# Patient Record
Sex: FEMALE | Race: OTHER | HISPANIC OR LATINO
[De-identification: names, ages, dates, MRNs, and addresses within clinical notes are randomized per-mention and may not be internally consistent; named-entity substitution may affect disease eponyms.]

---

## 2017-09-05 ENCOUNTER — APPOINTMENT (OUTPATIENT)
Dept: OTOLARYNGOLOGY | Facility: CLINIC | Age: 42
End: 2017-09-05
Payer: COMMERCIAL

## 2017-09-05 VITALS
DIASTOLIC BLOOD PRESSURE: 86 MMHG | BODY MASS INDEX: 27.66 KG/M2 | HEART RATE: 74 BPM | SYSTOLIC BLOOD PRESSURE: 128 MMHG | WEIGHT: 162 LBS | HEIGHT: 64 IN

## 2017-09-05 DIAGNOSIS — Z09 ENCOUNTER FOR FOLLOW-UP EXAMINATION AFTER COMPLETED TREATMENT FOR CONDITIONS OTHER THAN MALIGNANT NEOPLASM: ICD-10-CM

## 2017-09-05 DIAGNOSIS — J34.2 DEVIATED NASAL SEPTUM: ICD-10-CM

## 2017-09-05 DIAGNOSIS — J39.2 OTHER DISEASES OF PHARYNX: ICD-10-CM

## 2017-09-05 DIAGNOSIS — F45.8 OTHER SOMATOFORM DISORDERS: ICD-10-CM

## 2017-09-05 DIAGNOSIS — J32.0 CHRONIC MAXILLARY SINUSITIS: ICD-10-CM

## 2017-09-05 DIAGNOSIS — R49.0 DYSPHONIA: ICD-10-CM

## 2017-09-05 DIAGNOSIS — J34.3 HYPERTROPHY OF NASAL TURBINATES: ICD-10-CM

## 2017-09-05 PROCEDURE — 95004 PERQ TESTS W/ALRGNC XTRCS: CPT

## 2017-09-05 PROCEDURE — 31231 NASAL ENDOSCOPY DX: CPT

## 2017-09-05 PROCEDURE — 99214 OFFICE O/P EST MOD 30 MIN: CPT | Mod: 25

## 2020-05-26 ENCOUNTER — EMERGENCY (EMERGENCY)
Facility: HOSPITAL | Age: 45
LOS: 1 days | Discharge: HOME | End: 2020-05-26
Attending: EMERGENCY MEDICINE | Admitting: EMERGENCY MEDICINE
Payer: COMMERCIAL

## 2020-05-26 VITALS
TEMPERATURE: 98 F | OXYGEN SATURATION: 100 % | RESPIRATION RATE: 16 BRPM | HEART RATE: 90 BPM | SYSTOLIC BLOOD PRESSURE: 140 MMHG | DIASTOLIC BLOOD PRESSURE: 84 MMHG

## 2020-05-26 VITALS
DIASTOLIC BLOOD PRESSURE: 94 MMHG | SYSTOLIC BLOOD PRESSURE: 147 MMHG | HEART RATE: 79 BPM | OXYGEN SATURATION: 100 % | RESPIRATION RATE: 16 BRPM

## 2020-05-26 DIAGNOSIS — Z98.89 OTHER SPECIFIED POSTPROCEDURAL STATES: Chronic | ICD-10-CM

## 2020-05-26 LAB
ALBUMIN SERPL ELPH-MCNC: 4.3 G/DL — SIGNIFICANT CHANGE UP (ref 3.3–5)
ALP SERPL-CCNC: 89 U/L — SIGNIFICANT CHANGE UP (ref 40–120)
ALT FLD-CCNC: 19 U/L — SIGNIFICANT CHANGE UP (ref 4–33)
ANION GAP SERPL CALC-SCNC: 13 MMO/L — SIGNIFICANT CHANGE UP (ref 7–14)
AST SERPL-CCNC: 20 U/L — SIGNIFICANT CHANGE UP (ref 4–32)
BASOPHILS # BLD AUTO: 0.05 K/UL — SIGNIFICANT CHANGE UP (ref 0–0.2)
BASOPHILS NFR BLD AUTO: 0.6 % — SIGNIFICANT CHANGE UP (ref 0–2)
BILIRUB SERPL-MCNC: < 0.2 MG/DL — LOW (ref 0.2–1.2)
BUN SERPL-MCNC: 10 MG/DL — SIGNIFICANT CHANGE UP (ref 7–23)
CALCIUM SERPL-MCNC: 9.4 MG/DL — SIGNIFICANT CHANGE UP (ref 8.4–10.5)
CHLORIDE SERPL-SCNC: 102 MMOL/L — SIGNIFICANT CHANGE UP (ref 98–107)
CO2 SERPL-SCNC: 24 MMOL/L — SIGNIFICANT CHANGE UP (ref 22–31)
CREAT SERPL-MCNC: 0.61 MG/DL — SIGNIFICANT CHANGE UP (ref 0.5–1.3)
EOSINOPHIL # BLD AUTO: 0.1 K/UL — SIGNIFICANT CHANGE UP (ref 0–0.5)
EOSINOPHIL NFR BLD AUTO: 1.2 % — SIGNIFICANT CHANGE UP (ref 0–6)
GLUCOSE SERPL-MCNC: 143 MG/DL — HIGH (ref 70–99)
HCT VFR BLD CALC: 41 % — SIGNIFICANT CHANGE UP (ref 34.5–45)
HGB BLD-MCNC: 13.5 G/DL — SIGNIFICANT CHANGE UP (ref 11.5–15.5)
IMM GRANULOCYTES NFR BLD AUTO: 0.4 % — SIGNIFICANT CHANGE UP (ref 0–1.5)
LYMPHOCYTES # BLD AUTO: 1.8 K/UL — SIGNIFICANT CHANGE UP (ref 1–3.3)
LYMPHOCYTES # BLD AUTO: 21.5 % — SIGNIFICANT CHANGE UP (ref 13–44)
MCHC RBC-ENTMCNC: 28.5 PG — SIGNIFICANT CHANGE UP (ref 27–34)
MCHC RBC-ENTMCNC: 32.9 % — SIGNIFICANT CHANGE UP (ref 32–36)
MCV RBC AUTO: 86.5 FL — SIGNIFICANT CHANGE UP (ref 80–100)
MONOCYTES # BLD AUTO: 0.54 K/UL — SIGNIFICANT CHANGE UP (ref 0–0.9)
MONOCYTES NFR BLD AUTO: 6.5 % — SIGNIFICANT CHANGE UP (ref 2–14)
NEUTROPHILS # BLD AUTO: 5.85 K/UL — SIGNIFICANT CHANGE UP (ref 1.8–7.4)
NEUTROPHILS NFR BLD AUTO: 69.8 % — SIGNIFICANT CHANGE UP (ref 43–77)
NRBC # FLD: 0 K/UL — SIGNIFICANT CHANGE UP (ref 0–0)
PLATELET # BLD AUTO: 334 K/UL — SIGNIFICANT CHANGE UP (ref 150–400)
PMV BLD: 9.5 FL — SIGNIFICANT CHANGE UP (ref 7–13)
POTASSIUM SERPL-MCNC: 4 MMOL/L — SIGNIFICANT CHANGE UP (ref 3.5–5.3)
POTASSIUM SERPL-SCNC: 4 MMOL/L — SIGNIFICANT CHANGE UP (ref 3.5–5.3)
PROT SERPL-MCNC: 8.1 G/DL — SIGNIFICANT CHANGE UP (ref 6–8.3)
RBC # BLD: 4.74 M/UL — SIGNIFICANT CHANGE UP (ref 3.8–5.2)
RBC # FLD: 13.5 % — SIGNIFICANT CHANGE UP (ref 10.3–14.5)
SODIUM SERPL-SCNC: 139 MMOL/L — SIGNIFICANT CHANGE UP (ref 135–145)
TROPONIN T, HIGH SENSITIVITY: < 6 NG/L — SIGNIFICANT CHANGE UP (ref ?–14)
WBC # BLD: 8.37 K/UL — SIGNIFICANT CHANGE UP (ref 3.8–10.5)
WBC # FLD AUTO: 8.37 K/UL — SIGNIFICANT CHANGE UP (ref 3.8–10.5)

## 2020-05-26 PROCEDURE — 99284 EMERGENCY DEPT VISIT MOD MDM: CPT

## 2020-05-26 RX ORDER — MECLIZINE HCL 12.5 MG
50 TABLET ORAL ONCE
Refills: 0 | Status: COMPLETED | OUTPATIENT
Start: 2020-05-26 | End: 2020-05-26

## 2020-05-26 RX ORDER — METOCLOPRAMIDE HCL 10 MG
10 TABLET ORAL ONCE
Refills: 0 | Status: COMPLETED | OUTPATIENT
Start: 2020-05-26 | End: 2020-05-26

## 2020-05-26 RX ORDER — KETOROLAC TROMETHAMINE 30 MG/ML
15 SYRINGE (ML) INJECTION ONCE
Refills: 0 | Status: DISCONTINUED | OUTPATIENT
Start: 2020-05-26 | End: 2020-05-26

## 2020-05-26 RX ORDER — SODIUM CHLORIDE 9 MG/ML
1000 INJECTION INTRAMUSCULAR; INTRAVENOUS; SUBCUTANEOUS ONCE
Refills: 0 | Status: COMPLETED | OUTPATIENT
Start: 2020-05-26 | End: 2020-05-26

## 2020-05-26 RX ADMIN — Medication 15 MILLIGRAM(S): at 04:34

## 2020-05-26 RX ADMIN — Medication 50 MILLIGRAM(S): at 04:34

## 2020-05-26 RX ADMIN — SODIUM CHLORIDE 1000 MILLILITER(S): 9 INJECTION INTRAMUSCULAR; INTRAVENOUS; SUBCUTANEOUS at 04:33

## 2020-05-26 RX ADMIN — Medication 10 MILLIGRAM(S): at 04:34

## 2020-05-26 NOTE — ED PROVIDER NOTE - OBJECTIVE STATEMENT
44F denies pmh presents with c/o headache x 1-2 days.  States she had mild headache the previous evening when she went to bed, woke up with continued headache that progressively worsened through the day.  Pain radiates down right posterior neck to right shoulder.  +Nausea.  Took ibuprofen in the morning and Excedrin in the afternoon with little alleviation.  Notes that she felt dizzy when turning in bed, described as sensation of room spinning.  Also c/o intermittently feeling SOB over past few days.   Denies fever/chills, cp, sob, v/d, visual changes.

## 2020-05-26 NOTE — ED PROVIDER NOTE - PROGRESS NOTE DETAILS
Isabella Patterson M.D. Resident Isabella Patterson M.D. Resident  Pt states headache mildly better. Pt aware of negative workup, agreeable to followup with PMD.

## 2020-05-26 NOTE — ED PROVIDER NOTE - PATIENT PORTAL LINK FT
You can access the FollowMyHealth Patient Portal offered by [facility] by registering at the following website: [email or web address]. By joining [employer]’s FollowMyHealth portal, you will also be able to view your health information using other applications (apps) compatible with our system.

## 2020-05-26 NOTE — ED ADULT NURSE NOTE - OBJECTIVE STATEMENT
Pt. presents to intake a&ox4, ambulatory, and self-care. Pt. c/o headache starting from her eyes leading to the back of her neck. Pt. states the pain radiates down the neck to the right shoulder and endorses feeling nauseous. Pt. denies any chest pain, vomiting, fevers, chills, or body aches. Respirations even and unlabored. PHx of anxiety, and IBS. 20 G IV placed to L AC. Labs drawn and sent. Meds given per MD order. Awaiting further orders. Will continue to monitor.

## 2020-05-26 NOTE — ED PROVIDER NOTE - PMH
Anxiety    Breast cyst    Gall stones    GERD (gastroesophageal reflux disease)    GERD (gastroesophageal reflux disease)    H pylori ulcer  treated and resolved

## 2020-05-26 NOTE — ED PROVIDER NOTE - NSFOLLOWUPINSTRUCTIONS_ED_ALL_ED_FT
You were seen in the Emergency Department (ED) for headache.     Please take over the counter Tylenol as directed by the packaging for your pain.     Please follow up with your primary care doctor in the next 72 hours.  If you have issues obtaining follow up, please call: [phone] to obtain a doctor or specialist who takes your insurance in your area.    Please return to the ED if you experience any new or concerning symptoms, such as: worsening headache, vision change, chest pain, difficulty breathing, passing out, fever, chills.     Thank you for visiting a [facility] ED.

## 2020-05-26 NOTE — ED ADULT TRIAGE NOTE - CHIEF COMPLAINT QUOTE
Pt reporting frontal headache radiating to neck starting this morning with nausea. No blurred vision photophobia or vomiting.

## 2020-05-26 NOTE — ED PROVIDER NOTE - CLINICAL SUMMARY MEDICAL DECISION MAKING FREE TEXT BOX
- headache radiating to right shoulder, no red flags on history/exam, likely tension vs migraine headache; r/o ACS  - cbc, cmp, trop, meds, reassess

## 2020-05-28 ENCOUNTER — TRANSCRIPTION ENCOUNTER (OUTPATIENT)
Age: 45
End: 2020-05-28

## 2020-06-03 ENCOUNTER — APPOINTMENT (OUTPATIENT)
Dept: NEUROLOGY | Facility: CLINIC | Age: 45
End: 2020-06-03
Payer: COMMERCIAL

## 2020-06-03 VITALS — TEMPERATURE: 99.1 F

## 2020-06-03 VITALS — DIASTOLIC BLOOD PRESSURE: 80 MMHG | HEART RATE: 87 BPM | SYSTOLIC BLOOD PRESSURE: 142 MMHG

## 2020-06-03 PROCEDURE — 99205 OFFICE O/P NEW HI 60 MIN: CPT

## 2020-06-03 NOTE — HISTORY OF PRESENT ILLNESS
[FreeTextEntry1] : This is a 44-year-old right-handed female who presents with chief complaints of headaches.\par \par Patient states that she has a history of headaches which are usually around her menstrual cycle however in May she experienced a severe headache associated with dizziness which she describes as vertigo, made worse by change in body position and, nausea. The headache was initially in the forehead and she had a burning sensation in her eyes nose and top of her head. Toward the end of the day the headache spread to her spine and shoulder regions. She went to the emergency room at   [facility] where she was given ketorolac and metoclopramide. She states that her blood pressure was slightly elevated at that time. She describes the headache as all over her head and pulsating in character. It was equal on both sides. She was discharged.\par 3 days later the headache was still present so she went to an urgi Center. Her blood pressure was 127 /80  covid19 test was done, results pending\par \par Currently she is headache free. She states that in her 20s she used to get migraine headaches. After the birth of her first child in her 30s her headaches had reduced. Over the last 5-6 years she has been getting headaches around her menstrual cycle. The headaches are usually aggravated by strong smells. She prefers to be in a quite dark room. If she does not have any nausea or vomiting with her headaches. Headaches and it is relieved with Advil.\par \par She denies any other neurological complaints. No change in vision no recent weight gain. No weakness tingling numbness no change in balance. No falls. Difficulties with speech. No rashes. No joint swelling.\par \par Review of systems: A complete review of systems is performed and is negative except as listed in history of present illness\par \par \par Social history does not smoke, occasionally alcohol.\par Works as a \par \par Past medical history\par Fatty liver\par Cholelithiasis status post cholecystectomy\par Thornwaldt cyst\par Irritable bowel disease\par \par Medications\par P.r.n. Advil\par Multivitamins on and off\par \par Family history\par Father with headaches, diabetes and hypertension\par Paternal grandmother diabetes hypertension\par Mom hypertension\par maternal grandmother sudden death in her 70s\par maternal grandfather lung cancer\par

## 2020-06-03 NOTE — DISCUSSION/SUMMARY
[FreeTextEntry1] : This is a 44-year-old white handed female with known history of migraine headaches, usually around her menstrual cycle it is relieved with Advil. \par She presented to the emergency room with severe headache may 26th. This was different from her usual headache and a sense that was long lasting severe associated with nausea dizziness and pain in her neck that was going to her spine and shoulders.\par She has had a few episodes of blood pressure in the 140s. Her blood pressure was one 145/87\par Nonfocal neurological exam\par Plan\par MRI of the brain with contrast, for change in headache \par MRI C-spine patient is reporting neck pain which radiates down to her spine and shoulders\par Labs-CMP, CBC, TSH, ESR, CRP, beta-hCG, hba1c\par Imitrex 25 mg to take at the onset of headache\par Discussed treatment options such as propranolol that could help with high blood pressure and headaches. Her headaches are quite infrequent usually once a month.\par Encouraged to follow up with PCP for high blood pressure\par Has an ophthalmology appointment June 9\par Followup in 6 weeks

## 2020-06-03 NOTE — PHYSICAL EXAM
[General Appearance - Alert] : alert [General Appearance - In No Acute Distress] : in no acute distress [Oriented To Time, Place, And Person] : oriented to person, place, and time [Impaired Insight] : insight and judgment were intact [Person] : oriented to person [Affect] : the affect was normal [Time] : oriented to time [Place] : oriented to place [Concentration Intact] : normal concentrating ability [Visual Intact] : visual attention was ~T not ~L decreased [Naming Objects] : no difficulty naming common objects [Repeating Phrases] : no difficulty repeating a phrase [Writing A Sentence] : no difficulty writing a sentence [Fluency] : fluency intact [Comprehension] : comprehension intact [Past History] : adequate knowledge of personal past history [Reading] : reading intact [Cranial Nerves Oculomotor (III)] : extraocular motion intact [Cranial Nerves Optic (II)] : visual acuity intact bilaterally,  visual fields full to confrontation, pupils equal round and reactive to light [Cranial Nerves Trigeminal (V)] : facial sensation intact symmetrically [Cranial Nerves Facial (VII)] : face symmetrical [Cranial Nerves Vestibulocochlear (VIII)] : hearing was intact bilaterally [Cranial Nerves Glossopharyngeal (IX)] : tongue and palate midline [Cranial Nerves Hypoglossal (XII)] : there was no tongue deviation with protrusion [Cranial Nerves Accessory (XI - Cranial And Spinal)] : head turning and shoulder shrug symmetric [Motor Tone] : muscle tone was normal in all four extremities [Motor Strength] : muscle strength was normal in all four extremities [Sensation Tactile Decrease] : light touch was intact [No Muscle Atrophy] : normal bulk in all four extremities [Past-pointing] : there was no past-pointing [Balance] : balance was intact [Tremor] : no tremor present [Plantar Reflex Left Only] : normal on the left [Plantar Reflex Right Only] : normal on the right [Extraocular Movements] : extraocular movements were intact [Full Visual Field] : full visual field [Hearing Threshold Finger Rub Not [place]] : hearing was normal [Outer Ear] : the ears and nose were normal in appearance [Neck Appearance] : the appearance of the neck was normal [FreeTextEntry1] : Lhermitte sign negative [Edema] : there was no peripheral edema [] : no respiratory distress [Abnormal Walk] : normal gait [Skin Color & Pigmentation] : normal skin color and pigmentation

## 2020-06-09 ENCOUNTER — APPOINTMENT (OUTPATIENT)
Dept: OPHTHALMOLOGY | Facility: CLINIC | Age: 45
End: 2020-06-09
Payer: COMMERCIAL

## 2020-06-09 ENCOUNTER — NON-APPOINTMENT (OUTPATIENT)
Age: 45
End: 2020-06-09

## 2020-06-09 PROCEDURE — 92004 COMPRE OPH EXAM NEW PT 1/>: CPT

## 2020-06-27 ENCOUNTER — OUTPATIENT (OUTPATIENT)
Dept: OUTPATIENT SERVICES | Facility: HOSPITAL | Age: 45
LOS: 1 days | End: 2020-06-27
Payer: COMMERCIAL

## 2020-06-27 ENCOUNTER — APPOINTMENT (OUTPATIENT)
Dept: MRI IMAGING | Facility: CLINIC | Age: 45
End: 2020-06-27
Payer: COMMERCIAL

## 2020-06-27 DIAGNOSIS — Z98.89 OTHER SPECIFIED POSTPROCEDURAL STATES: Chronic | ICD-10-CM

## 2020-06-27 DIAGNOSIS — R51 HEADACHE: ICD-10-CM

## 2020-06-27 PROCEDURE — 72141 MRI NECK SPINE W/O DYE: CPT

## 2020-06-27 PROCEDURE — A9585: CPT

## 2020-06-27 PROCEDURE — 70553 MRI BRAIN STEM W/O & W/DYE: CPT

## 2020-06-27 PROCEDURE — 72141 MRI NECK SPINE W/O DYE: CPT | Mod: 26

## 2020-06-27 PROCEDURE — 70553 MRI BRAIN STEM W/O & W/DYE: CPT | Mod: 26

## 2020-07-15 ENCOUNTER — APPOINTMENT (OUTPATIENT)
Dept: NEUROLOGY | Facility: CLINIC | Age: 45
End: 2020-07-15
Payer: COMMERCIAL

## 2020-07-15 VITALS
HEART RATE: 68 BPM | HEIGHT: 64 IN | WEIGHT: 169 LBS | BODY MASS INDEX: 28.85 KG/M2 | DIASTOLIC BLOOD PRESSURE: 84 MMHG | SYSTOLIC BLOOD PRESSURE: 126 MMHG

## 2020-07-15 VITALS — TEMPERATURE: 97.5 F

## 2020-07-15 DIAGNOSIS — M54.2 CERVICALGIA: ICD-10-CM

## 2020-07-15 PROCEDURE — 99214 OFFICE O/P EST MOD 30 MIN: CPT

## 2020-07-15 NOTE — PHYSICAL EXAM
[General Appearance - In No Acute Distress] : in no acute distress [General Appearance - Alert] : alert [Oriented To Time, Place, And Person] : oriented to person, place, and time [Impaired Insight] : insight and judgment were intact [Affect] : the affect was normal [Place] : oriented to place [Person] : oriented to person [Time] : oriented to time [Concentration Intact] : normal concentrating ability [Visual Intact] : visual attention was ~T not ~L decreased [Naming Objects] : no difficulty naming common objects [Repeating Phrases] : no difficulty repeating a phrase [Writing A Sentence] : no difficulty writing a sentence [Fluency] : fluency intact [Reading] : reading intact [Comprehension] : comprehension intact [Past History] : adequate knowledge of personal past history [Cranial Nerves Oculomotor (III)] : extraocular motion intact [Cranial Nerves Optic (II)] : visual acuity intact bilaterally,  visual fields full to confrontation, pupils equal round and reactive to light [Cranial Nerves Trigeminal (V)] : facial sensation intact symmetrically [Cranial Nerves Vestibulocochlear (VIII)] : hearing was intact bilaterally [Cranial Nerves Facial (VII)] : face symmetrical [Motor Tone] : muscle tone was normal in all four extremities [Cranial Nerves Glossopharyngeal (IX)] : tongue and palate midline [Cranial Nerves Accessory (XI - Cranial And Spinal)] : head turning and shoulder shrug symmetric [Cranial Nerves Hypoglossal (XII)] : there was no tongue deviation with protrusion [Motor Strength] : muscle strength was normal in all four extremities [No Muscle Atrophy] : normal bulk in all four extremities [Sensation Tactile Decrease] : light touch was intact [Balance] : balance was intact [Past-pointing] : there was no past-pointing [Plantar Reflex Right Only] : normal on the right [Tremor] : no tremor present [Plantar Reflex Left Only] : normal on the left [Full Visual Field] : full visual field [Extraocular Movements] : extraocular movements were intact [Hearing Threshold Finger Rub Not [place]] : hearing was normal [Neck Appearance] : the appearance of the neck was normal [Outer Ear] : the ears and nose were normal in appearance [FreeTextEntry1] : Lhermitte sign negative [] : no respiratory distress [Edema] : there was no peripheral edema [Abnormal Walk] : normal gait [Skin Color & Pigmentation] : normal skin color and pigmentation

## 2020-07-15 NOTE — HISTORY OF PRESENT ILLNESS
[FreeTextEntry1] : This is a 44-year-old right-handed female who presents with chief complaints of headaches.\par \par Patient states that she has a history of headaches which are usually around her menstrual cycle however in May she experienced a severe headache associated with dizziness which she describes as vertigo, made worse by change in body position and, nausea. The headache was initially in the forehead and she had a burning sensation in her eyes nose and top of her head. Toward the end of the day the headache spread to her spine and shoulder regions. She went to the emergency room at   [facility] where she was given ketorolac and metoclopramide. She states that her blood pressure was slightly elevated at that time. She describes the headache as all over her head and pulsating in character. It was equal on both sides. She was discharged.\par 3 days later the headache was still present so she went to an urgi Center. Her blood pressure was 127 /80  covid19 test was done, results pending\par \par Currently she is headache free. She states that in her 20s she used to get migraine headaches. After the birth of her first child in her 30s her headaches had reduced. Over the last 5-6 years she has been getting headaches around her menstrual cycle. The headaches are usually aggravated by strong smells. She prefers to be in a quite dark room. If she does not have any nausea or vomiting with her headaches. Headaches and it is relieved with Advil.\par \par She denies any other neurological complaints. No change in vision no recent weight gain. No weakness tingling numbness no change in balance. No falls. Difficulties with speech. No rashes. No joint swelling.\par \par Review of systems: A complete review of systems is performed and is negative except as listed in history of present illness\par \par \par Social history does not smoke, occasionally alcohol.\par Works as a \par \par Past medical history\par Fatty liver\par Cholelithiasis status post cholecystectomy\par Thornwaldt cyst\par Irritable bowel disease\par \par Medications\par P.r.n. Advil\par Multivitamins on and off\par \par Family history\par Father with headaches, diabetes and hypertension\par Paternal grandmother diabetes hypertension\par Mom hypertension\par maternal grandmother sudden death in her 70s\par maternal grandfather lung cancer\par \par Interval history: Patient is here to followup on imaging results. She has had no further headaches, denies any neurological symptoms other than numbness in hands when she wakes up in the morning. Denies any radicular symptoms, no weakness, no change in bowel or bladder. No change in balance. Does report occasional neck discomfort especially in the morning where she feels like she didn't have a comfortable pillow but this resolves as the day goes on. \par She also had a cardiology followup with 24-hour Holter. Medication was suggested to help with headaches and a rapid heart beat which she declined. Does not remember the name of the medication. She states she had labs done including normal lipids, CRP, ESR. I do not have that report.\par

## 2020-07-15 NOTE — DISCUSSION/SUMMARY
[FreeTextEntry1] : This is a 44-year-old white handed female with known history of migraine headaches, usually around her menstrual cycle it is relieved with Advil. \par She presented to the emergency room with severe headache may 26th. This was different from her usual headache and a sense that was long lasting severe associated with nausea dizziness and pain in her neck that was going to her spine and shoulders.\par She has had a few episodes of blood pressure in the 140s.\par Plan\par MRI of the brain with contrast, for change in headache - non specific R frontal periventricular lesion, non enhancing, no other lesions seen, images reviewed with her, printed report provided for her records\par MRI C-spine -cervical straightening, focal R paracentral disc herniation C5-C6 with moderate sac effacement, no cord compression, edema,myelopathy. non focal neuro exam. discussed pain control which at present is manageable, discussed availability of spine center. advised to avoid sudden neck movements and asked to monitor for any change in symptoms such as weakness, numbness, sensory changes, gait, b/b changes.\par transient bilateral hand numbness on waking up, likely CTS, can try wrist splints at night, if no improvement can consider EMG\par Labs-CMP, CBC, TSH, ESR, CRP, beta-hCG, hba1c done, will fax results, was told everything was wnl\par all questions addressed and answered\par F/u as needed

## 2020-08-18 ENCOUNTER — APPOINTMENT (OUTPATIENT)
Dept: ORTHOPEDIC SURGERY | Facility: CLINIC | Age: 45
End: 2020-08-18
Payer: COMMERCIAL

## 2020-08-18 VITALS
HEART RATE: 64 BPM | BODY MASS INDEX: 29.77 KG/M2 | SYSTOLIC BLOOD PRESSURE: 126 MMHG | TEMPERATURE: 98 F | DIASTOLIC BLOOD PRESSURE: 79 MMHG | WEIGHT: 168 LBS | HEIGHT: 63 IN

## 2020-08-18 DIAGNOSIS — M22.2X1 PATELLOFEMORAL DISORDERS, RIGHT KNEE: ICD-10-CM

## 2020-08-18 DIAGNOSIS — G56.32 LESION OF RADIAL NERVE, LEFT UPPER LIMB: ICD-10-CM

## 2020-08-18 PROCEDURE — 73080 X-RAY EXAM OF ELBOW: CPT | Mod: LT

## 2020-08-18 PROCEDURE — 99203 OFFICE O/P NEW LOW 30 MIN: CPT

## 2020-08-18 PROCEDURE — 73564 X-RAY EXAM KNEE 4 OR MORE: CPT | Mod: RT

## 2020-09-08 ENCOUNTER — APPOINTMENT (OUTPATIENT)
Dept: ORTHOPEDIC SURGERY | Facility: CLINIC | Age: 45
End: 2020-09-08

## 2021-09-13 ENCOUNTER — APPOINTMENT (OUTPATIENT)
Dept: OBGYN | Facility: CLINIC | Age: 46
End: 2021-09-13

## 2021-09-30 ENCOUNTER — RESULT REVIEW (OUTPATIENT)
Age: 46
End: 2021-09-30

## 2021-11-04 ENCOUNTER — APPOINTMENT (OUTPATIENT)
Dept: NEUROLOGY | Facility: CLINIC | Age: 46
End: 2021-11-04
Payer: MEDICAID

## 2021-11-04 ENCOUNTER — NON-APPOINTMENT (OUTPATIENT)
Age: 46
End: 2021-11-04

## 2021-11-04 VITALS
HEART RATE: 68 BPM | SYSTOLIC BLOOD PRESSURE: 130 MMHG | DIASTOLIC BLOOD PRESSURE: 76 MMHG | WEIGHT: 170 LBS | BODY MASS INDEX: 30.12 KG/M2 | HEIGHT: 63 IN

## 2021-11-04 DIAGNOSIS — R20.0 ANESTHESIA OF SKIN: ICD-10-CM

## 2021-11-04 DIAGNOSIS — R20.2 ANESTHESIA OF SKIN: ICD-10-CM

## 2021-11-04 PROCEDURE — 99214 OFFICE O/P EST MOD 30 MIN: CPT

## 2021-11-04 RX ORDER — FLUTICASONE PROPIONATE 50 UG/1
50 SPRAY, METERED NASAL DAILY
Qty: 1 | Refills: 3 | Status: DISCONTINUED | COMMUNITY
Start: 2017-09-05 | End: 2021-11-04

## 2021-11-04 RX ORDER — PREDNISONE 10 MG/1
10 TABLET ORAL
Qty: 27 | Refills: 0 | Status: DISCONTINUED | COMMUNITY
Start: 2017-09-05 | End: 2021-11-04

## 2021-11-04 RX ORDER — MELOXICAM 15 MG/1
15 TABLET ORAL DAILY
Qty: 30 | Refills: 0 | Status: DISCONTINUED | COMMUNITY
Start: 2020-08-18 | End: 2021-11-04

## 2021-11-04 RX ORDER — OXYMETAZOLINE HCL 0.05 %
0.05 SPRAY, NON-AEROSOL (ML) NASAL
Qty: 1 | Refills: 0 | Status: DISCONTINUED | COMMUNITY
Start: 2017-09-05 | End: 2021-11-04

## 2021-11-04 RX ORDER — AMOXICILLIN AND CLAVULANATE POTASSIUM 875; 125 MG/1; MG/1
875-125 TABLET, COATED ORAL
Qty: 28 | Refills: 0 | Status: DISCONTINUED | COMMUNITY
Start: 2017-09-05 | End: 2021-11-04

## 2021-11-04 NOTE — PHYSICAL EXAM
[General Appearance - Alert] : alert [Oriented To Time, Place, And Person] : oriented to person, place, and time [Person] : oriented to person [Place] : oriented to place [Time] : oriented to time [Short Term Intact] : short term memory intact [Fluency] : fluency intact [Current Events] : adequate knowledge of current events [Cranial Nerves Optic (II)] : visual acuity intact bilaterally,  visual fields full to confrontation, pupils equal round and reactive to light [Cranial Nerves Oculomotor (III)] : extraocular motion intact [Cranial Nerves Vestibulocochlear (VIII)] : hearing was intact bilaterally [Cranial Nerves Accessory (XI - Cranial And Spinal)] : head turning and shoulder shrug symmetric [Motor Tone] : muscle tone was normal in all four extremities [Motor Strength] : muscle strength was normal in all four extremities [Sensation Tactile Decrease] : light touch was intact [Sensation Pain / Temperature Decrease] : pain and temperature was intact [Dysesthesia] : no dysesthesia [Hyperesthesia] : no hyperesthesia [Abnormal Walk] : normal gait [Coordination - Dysmetria Impaired Finger-to-Nose Bilateral] : not present [2+] : Patella left 2+

## 2021-11-04 NOTE — HISTORY OF PRESENT ILLNESS
[FreeTextEntry1] : 46-year-old woman who has seen Dr. Angel in the past for headaches.  Patient states that sumatriptan is helping her with her headaches when she gets it around her menses.  Patient had a question about the right frontal nonspecific lesion that was seen on her MRI of the brain that was ordered by Dr. Angel.  With the images were reviewed with the patient in detail and patient was advised that while it is nonenhancing this is a very nonspecific finding and we will continue to monitor her for any other neurologic symptoms and if she has other symptoms will repeat the MRI of the brain with and without contrast.  Patient states that in August she went to [facility] for 5 days and her skin felt taut.  When she went home she felt numbness and tingling in both legs and both hands and forearm.  It came every other day and in September it stopped.  Patient states that during that time.  She experiences symptoms whenever she sat for 8 hours.  Patient denied any fevers or prior events.  Patient did not have any medications or other trauma that might have triggered the symptoms.  [facility] was unremarkable in terms of GI upset.

## 2021-11-04 NOTE — DISCUSSION/SUMMARY
[FreeTextEntry1] : 46-year-old woman who is here for initial consultation of numbness and tingling in her legs, hands and forearms.  It lasted for about a month in August and September and it resolved on its own.  At this time we will continue to monitor her symptoms and if it ever returns we will have her come back for nerve conduction studies.  We went over the MRI of the head that was performed for headache and the right frontal lesion is nonspecific finding for someone who is 46.  She will continue to monitor for any other neurologic symptoms and return as needed.\par \par I spent the time noted on the day of this patient encounter preparing for, providing and documenting the above E/M service and counseling and educate patient on differential, workup, disease course, and treatment/management. Education was provided to the patient during this encounter. All questions and concerns were answered and addressed in detail. The patient verbalized understanding and agreed to plan. Patient was advised to continue to monitor for neurologic symptoms and to notify my office or go to the nearest emergency room if there are any changes. Any orders/referrals and communications were provided as well. \par Side effects of the above medications were discussed in detail including but not limited to applicable black box warning and teratogenicity as appropriate. \par Patient was advised to bring previous records to my office. \par \par \par

## 2022-09-21 ENCOUNTER — APPOINTMENT (OUTPATIENT)
Dept: NEUROLOGY | Facility: CLINIC | Age: 47
End: 2022-09-21

## 2022-09-21 VITALS
HEART RATE: 72 BPM | BODY MASS INDEX: 30.12 KG/M2 | WEIGHT: 170 LBS | DIASTOLIC BLOOD PRESSURE: 78 MMHG | HEIGHT: 63 IN | SYSTOLIC BLOOD PRESSURE: 128 MMHG

## 2022-09-21 DIAGNOSIS — R44.8 OTHER SYMPTOMS AND SIGNS INVOLVING GENERAL SENSATIONS AND PERCEPTIONS: ICD-10-CM

## 2022-09-21 DIAGNOSIS — R51.9 HEADACHE, UNSPECIFIED: ICD-10-CM

## 2022-09-21 PROCEDURE — 99215 OFFICE O/P EST HI 40 MIN: CPT

## 2022-09-21 RX ORDER — DULOXETINE HYDROCHLORIDE 60 MG/1
60 CAPSULE, DELAYED RELEASE PELLETS ORAL
Qty: 60 | Refills: 0 | Status: ACTIVE | COMMUNITY
Start: 2022-09-21 | End: 1900-01-01

## 2022-09-21 RX ORDER — SUMATRIPTAN 25 MG/1
25 TABLET, FILM COATED ORAL TWICE DAILY
Qty: 9 | Refills: 3 | Status: ACTIVE | COMMUNITY
Start: 2020-06-03 | End: 1900-01-01

## 2022-09-21 NOTE — DISCUSSION/SUMMARY
[FreeTextEntry1] : 47-year-old woman who is under a lot of stress and is going through menopause who is here for recurrence of her tension headache.  Patient was advised that she may want to try duloxetine 60 mg daily to help with her headache and with mood stabilization.  Patient will continue sumatriptan as needed.  Patient will also follow-up with her PMD for her chest tightness with negative EKG.  She may have recurrence of her GERD.  Patient will also follow-up with her GYN for her symptoms of menopause.  Patient requested and will repeat MRI of her head given that there was a T2 lesion in the right frontal lobe on her 2020 MRI of the head with and without contrast.  It was not enhancing so therefore we will repeat an MRI of the head without contrast.\par \par I spent the time noted on the day of this patient encounter preparing for, providing and documenting the above E/M service and counseling and educate patient on differential, workup, disease course, and treatment/management. Education was provided to the patient during this encounter. All questions and concerns were answered and addressed in detail. The patient verbalized understanding and agreed to plan. Patient was advised to continue to monitor for neurologic symptoms and to notify my office or go to the nearest emergency room if there are any changes. Any orders/referrals and communications were provided as well. \par Side effects of the above medications were discussed in detail including but not limited to applicable black box warning and teratogenicity as appropriate. \par Patient was advised to bring previous records to my office. \par \par \par

## 2022-09-21 NOTE — HISTORY OF PRESENT ILLNESS
[FreeTextEntry1] : 47-year-old woman who has seen Dr. Angel in the past for headaches.  Patient states that sumatriptan is helping her with her headaches when she gets it around her menses.  Patient had a question about the right frontal nonspecific lesion that was seen on her MRI of the brain that was ordered by Dr. Angel.  With the images were reviewed with the patient in detail and patient was advised that while it is nonenhancing this is a very nonspecific finding and we will continue to monitor her for any other neurologic symptoms and if she has other symptoms will repeat the MRI of the brain with and without contrast.  Patient states that in August she went to [facility] for 5 days and her skin felt taut.  When she went home she felt numbness and tingling in both legs and both hands and forearm.  It came every other day and in September it stopped.  Patient states that during that time.  She experiences symptoms whenever she sat for 8 hours.  Patient denied any fevers or prior events.  Patient did not have any medications or other trauma that might have triggered the symptoms.  Her [employer] trip was unremarkable in terms of GI upset.\par \par Interval history: Patient states that since her last visit with me in November 2021 patient states that her headaches returned.  It is located in the bilateral temples and it is not responding to the sumatriptan 25 mg.  Patient also experiences tingling and tightness in her face lips and tongue.  Patient admits to having a lot of stress at work and also her child's having a lot of medical issues.  Patient may also be going through menopause as she is experiencing hot flashes and lack of period for the past 2 months.  Patient was advised to follow-up with her GYN regarding her possible menopause symptoms.  Patient states that a couple of days ago patient ate a chocolate and she had chest tightness.  She went to urgent care and EKG was unremarkable.  Patient was advised she should follow-up with her PMD as she has history of GERD for which she was on omeprazole but she is no longer taking that.

## 2022-11-01 ENCOUNTER — EMERGENCY (EMERGENCY)
Facility: HOSPITAL | Age: 47
LOS: 1 days | Discharge: HOME | End: 2022-11-01
Attending: STUDENT IN AN ORGANIZED HEALTH CARE EDUCATION/TRAINING PROGRAM | Admitting: STUDENT IN AN ORGANIZED HEALTH CARE EDUCATION/TRAINING PROGRAM

## 2022-11-01 VITALS
DIASTOLIC BLOOD PRESSURE: 61 MMHG | TEMPERATURE: 98 F | HEART RATE: 73 BPM | SYSTOLIC BLOOD PRESSURE: 119 MMHG | RESPIRATION RATE: 16 BRPM | OXYGEN SATURATION: 98 %

## 2022-11-01 VITALS
OXYGEN SATURATION: 100 % | HEART RATE: 90 BPM | RESPIRATION RATE: 16 BRPM | DIASTOLIC BLOOD PRESSURE: 70 MMHG | TEMPERATURE: 98 F | SYSTOLIC BLOOD PRESSURE: 135 MMHG

## 2022-11-01 DIAGNOSIS — Z98.89 OTHER SPECIFIED POSTPROCEDURAL STATES: Chronic | ICD-10-CM

## 2022-11-01 PROCEDURE — 99284 EMERGENCY DEPT VISIT MOD MDM: CPT

## 2022-11-01 RX ORDER — SODIUM CHLORIDE 9 MG/ML
1000 INJECTION INTRAMUSCULAR; INTRAVENOUS; SUBCUTANEOUS ONCE
Refills: 0 | Status: COMPLETED | OUTPATIENT
Start: 2022-11-01 | End: 2022-11-01

## 2022-11-01 RX ORDER — KETOROLAC TROMETHAMINE 30 MG/ML
15 SYRINGE (ML) INJECTION ONCE
Refills: 0 | Status: DISCONTINUED | OUTPATIENT
Start: 2022-11-01 | End: 2022-11-01

## 2022-11-01 RX ORDER — METOCLOPRAMIDE HCL 10 MG
10 TABLET ORAL ONCE
Refills: 0 | Status: COMPLETED | OUTPATIENT
Start: 2022-11-01 | End: 2022-11-01

## 2022-11-01 RX ADMIN — Medication 15 MILLIGRAM(S): at 15:54

## 2022-11-01 RX ADMIN — Medication 15 MILLIGRAM(S): at 16:49

## 2022-11-01 RX ADMIN — SODIUM CHLORIDE 1000 MILLILITER(S): 9 INJECTION INTRAMUSCULAR; INTRAVENOUS; SUBCUTANEOUS at 15:55

## 2022-11-01 RX ADMIN — SODIUM CHLORIDE 1000 MILLILITER(S): 9 INJECTION INTRAMUSCULAR; INTRAVENOUS; SUBCUTANEOUS at 16:49

## 2022-11-01 RX ADMIN — Medication 10 MILLIGRAM(S): at 15:55

## 2022-11-01 NOTE — ED PROVIDER NOTE - PATIENT PORTAL LINK FT
You can access the FollowMyHealth Patient Portal offered by [facility] by registering at the following website: [email or web address]. By joining [employer]’s FollowMyHealth portal, you will also be able to view your health information using other applications (apps) compatible with our system.

## 2022-11-01 NOTE — ED PROVIDER NOTE - NSICDXPASTSURGICALHX_GEN_ALL_CORE_FT
PAST SURGICAL HISTORY:  No significant past surgical history     S/P endoscopy upper endoscopy 7/2016

## 2022-11-01 NOTE — ED PROVIDER NOTE - ATTENDING CONTRIBUTION TO CARE
Dr. Garvey, Attending Physician-  I performed a face to face bedside interview with patient regarding history of present illness, review of symptoms and past medical history. I completed an independent physical exam.  I have discussed patient's plan of care with the resident.    47F, hx of migraines, who presents with headache since this morning. Not sudden/max at onset. No fevers/chills. No visual symptoms. No trauma. Not on blood thinners. Pressure like in quality involving bilateral temporals. Headache is more diffuse than typical migraines. No LOC. No focal weakness. No visual deficits. On ROS, reports small amounts of diarrhea. No chest pain, sob, belly pain, nausea, vomiting, dysuria, hematuria, recent travel, trauma, syncope. Physical: afebrile, well appearing, neck supple, rrr, ctabl, abdomen soft, stable gait, 5/5 strength in all four extremities. Plan: migraine cocktail. No neuro deficits noted. patient well appearing.

## 2022-11-01 NOTE — ED PROVIDER NOTE - PHYSICAL EXAMINATION
Const: not in acute distress  Eyes: no conjunctival injection  HEENT: Head NCAT, Moist MM.  Neck: Trachea midline.   CVS: +S1/S2, Peripheral pulses 2+ and equal in all extremities.  RESP: Unlabored respiratory effort. Clear to auscultation bilaterally.  GI: Nontender/Nondistended, No CVA tenderness b/l.   MSK: Normocephalic/Atraumatic, No Lower Extremities edema b/l.   Skin: Intact.   Neuro: CNs II-XII grossly intact. Motor & Sensation grossly intact.  Psych: Awake, Alert, & Oriented (AAO) x3.

## 2022-11-01 NOTE — ED ADULT TRIAGE NOTE - CHIEF COMPLAINT QUOTE
pressure-like headache, nausea and loose stools since AM, speaking clear, gait steady, no droop, denies blood in stool, vision changes. denies pmhx

## 2022-11-01 NOTE — ED PROVIDER NOTE - NS ED ROS FT
CONST: no fevers, no chills, no trauma  EYES: no blurry vision   ENT: no sore throat  CV: no chest pain, no extremity swelling  RESP: no shortness of breath  ABD: no abdominal pain, (+) nausea, no vomiting, no diarrhea, no melena  : no dysuria, no hematuria, no frequency  MSK: no back pain, no neck pain, no extremity pain  NEURO: (+) headache, no focal weakness, no dizziness  HEME: no bruising  SKIN: no rash

## 2022-11-01 NOTE — ED PROVIDER NOTE - CLINICAL SUMMARY MEDICAL DECISION MAKING FREE TEXT BOX
48 yo female with PMhx migraines, presents with HA since this morning. Likely tension vs migraine vs cluster. Will treat HA and have patient follow up with her neurologist.

## 2022-11-01 NOTE — ED PROVIDER NOTE - OBJECTIVE STATEMENT
48 yo female with PMhx migraines, presents with HA since this morning. Patient states she woke up with headache, described as pressure, from neck to bilateral head to neck, gradual onset. Patient took sumatriptan prescribed by neurologist with little relief. Patient states HA different from normal migraine, more diffuse location, usually bilateral temple. Patient endorses associated nausea.

## 2022-11-01 NOTE — ED PROVIDER NOTE - NSICDXFAMILYHX_GEN_ALL_CORE_FT
FAMILY HISTORY:  No pertinent family history in first degree relatives    Mother  Still living? Yes, Estimated age: Age Unknown  Family history of valvular heart disease, Age at diagnosis: Age Unknown

## 2022-11-01 NOTE — ED ADULT NURSE NOTE - OBJECTIVE STATEMENT
47 year old female, received to intake 1. A&Ox4, ambulatory, respirations equal and unlabored. Pt c/o headache, nausea, diarrhea, dizziness that started since she woke up this morning. Pt with history of headaches. Pt denies CP, palpitations, SOB, lethargy, vomiting, any other medical complaints. Neuro intact. PERRLA. +2 pulses radial and pedal. Abdomen soft, tender, nondistended. Skin dry and intact. 20G IV placed to LAC. Medicated as EMR orders. Will continue to monitor.

## 2022-11-01 NOTE — ED PROVIDER NOTE - NSFOLLOWUPINSTRUCTIONS_ED_ALL_ED_FT
You were seen in the Emergency Department for headache. You improved with medication. Please follow-up with your neurologist concerning these migraines.    1) Advance activity as tolerated.   2) Continue all previously prescribed medications as directed.    3) Follow up with your primary care physician in 24-48 hours - take copies of your results.    4) Return to the Emergency Department for worsening or persistent symptoms, worsening headache, weakness or numbness in the extremity, slurring speech, nausea/vomiting, and/or ANY NEW OR CONCERNING SYMPTOMS.

## 2022-11-01 NOTE — ED PROVIDER NOTE - NSICDXPASTMEDICALHX_GEN_ALL_CORE_FT
PAST MEDICAL HISTORY:  Anxiety     Breast cyst     Gall stones     GERD (gastroesophageal reflux disease)     GERD (gastroesophageal reflux disease)     H pylori ulcer treated and resolved